# Patient Record
Sex: FEMALE | Race: ASIAN | NOT HISPANIC OR LATINO | URBAN - METROPOLITAN AREA
[De-identification: names, ages, dates, MRNs, and addresses within clinical notes are randomized per-mention and may not be internally consistent; named-entity substitution may affect disease eponyms.]

---

## 2024-08-02 ENCOUNTER — INPATIENT (INPATIENT)
Facility: HOSPITAL | Age: 29
LOS: 1 days | Discharge: HOME | End: 2024-08-04
Attending: SPECIALIST | Admitting: SPECIALIST

## 2024-08-02 VITALS — RESPIRATION RATE: 16 BRPM | TEMPERATURE: 98 F

## 2024-08-02 DIAGNOSIS — O99.810 ABNORMAL GLUCOSE COMPLICATING PREGNANCY: ICD-10-CM

## 2024-08-02 LAB
BASOPHILS # BLD AUTO: 0.02 K/UL — SIGNIFICANT CHANGE UP (ref 0–0.2)
BASOPHILS NFR BLD AUTO: 0.3 % — SIGNIFICANT CHANGE UP (ref 0–2)
EOSINOPHIL # BLD AUTO: 0.12 K/UL — SIGNIFICANT CHANGE UP (ref 0–0.5)
EOSINOPHIL NFR BLD AUTO: 1.7 % — SIGNIFICANT CHANGE UP (ref 0–6)
GLUCOSE BLDC GLUCOMTR-MCNC: 94 MG/DL — SIGNIFICANT CHANGE UP (ref 70–99)
HCT VFR BLD CALC: 38.7 % — SIGNIFICANT CHANGE UP (ref 34.5–45)
HGB BLD-MCNC: 12.7 G/DL — SIGNIFICANT CHANGE UP (ref 11.5–15.5)
IANC: 4.54 K/UL — SIGNIFICANT CHANGE UP (ref 1.8–7.4)
IMM GRANULOCYTES NFR BLD AUTO: 0.4 % — SIGNIFICANT CHANGE UP (ref 0–0.9)
LYMPHOCYTES # BLD AUTO: 1.63 K/UL — SIGNIFICANT CHANGE UP (ref 1–3.3)
LYMPHOCYTES # BLD AUTO: 23.3 % — SIGNIFICANT CHANGE UP (ref 13–44)
MCHC RBC-ENTMCNC: 28.4 PG — SIGNIFICANT CHANGE UP (ref 27–34)
MCHC RBC-ENTMCNC: 32.8 GM/DL — SIGNIFICANT CHANGE UP (ref 32–36)
MCV RBC AUTO: 86.6 FL — SIGNIFICANT CHANGE UP (ref 80–100)
MONOCYTES # BLD AUTO: 0.66 K/UL — SIGNIFICANT CHANGE UP (ref 0–0.9)
MONOCYTES NFR BLD AUTO: 9.4 % — SIGNIFICANT CHANGE UP (ref 2–14)
NEUTROPHILS # BLD AUTO: 4.54 K/UL — SIGNIFICANT CHANGE UP (ref 1.8–7.4)
NEUTROPHILS NFR BLD AUTO: 64.9 % — SIGNIFICANT CHANGE UP (ref 43–77)
NRBC # BLD: 0 /100 WBCS — SIGNIFICANT CHANGE UP (ref 0–0)
NRBC # FLD: 0 K/UL — SIGNIFICANT CHANGE UP (ref 0–0)
PLATELET # BLD AUTO: 149 K/UL — LOW (ref 150–400)
RBC # BLD: 4.47 M/UL — SIGNIFICANT CHANGE UP (ref 3.8–5.2)
RBC # FLD: 13.9 % — SIGNIFICANT CHANGE UP (ref 10.3–14.5)
WBC # BLD: 7 K/UL — SIGNIFICANT CHANGE UP (ref 3.8–10.5)
WBC # FLD AUTO: 7 K/UL — SIGNIFICANT CHANGE UP (ref 3.8–10.5)

## 2024-08-02 RX ORDER — OXYTOCIN/RINGER'S LACTATE 20/1000 ML
PLASTIC BAG, INJECTION (ML) INTRAVENOUS
Qty: 20 | Refills: 0 | Status: COMPLETED | OUTPATIENT
Start: 2024-08-02 | End: 2024-08-03

## 2024-08-02 RX ORDER — BACTERIOSTATIC SODIUM CHLORIDE 0.9 %
1000 VIAL (ML) INJECTION
Refills: 0 | Status: DISCONTINUED | OUTPATIENT
Start: 2024-08-02 | End: 2024-08-03

## 2024-08-02 RX ORDER — TRISODIUM CITRATE DIHYDRATE AND CITRIC ACID MONOHYDRATE 500; 334 MG/5ML; MG/5ML
15 SOLUTION ORAL EVERY 6 HOURS
Refills: 0 | Status: DISCONTINUED | OUTPATIENT
Start: 2024-08-02 | End: 2024-08-03

## 2024-08-02 RX ORDER — CHLORHEXIDINE GLUCONATE 500 MG/1
1 CLOTH TOPICAL DAILY
Refills: 0 | Status: DISCONTINUED | OUTPATIENT
Start: 2024-08-02 | End: 2024-08-03

## 2024-08-02 RX ORDER — DEXTROSE MONOHYDRATE, SODIUM CHLORIDE, SODIUM LACTATE, CALCIUM CHLORIDE, MAGNESIUM CHLORIDE 1.5; 538; 448; 18.4; 5.08 G/100ML; MG/100ML; MG/100ML; MG/100ML; MG/100ML
1000 SOLUTION INTRAPERITONEAL
Refills: 0 | Status: DISCONTINUED | OUTPATIENT
Start: 2024-08-02 | End: 2024-08-03

## 2024-08-02 NOTE — OB RN PATIENT PROFILE - FALL HARM RISK - UNIVERSAL INTERVENTIONS
Bed in lowest position, wheels locked, appropriate side rails in place/Call bell, personal items and telephone in reach/Instruct patient to call for assistance before getting out of bed or chair/Non-slip footwear when patient is out of bed/[place] to call system/Physically safe environment - no spills, clutter or unnecessary equipment/Purposeful Proactive Rounding/Room/bathroom lighting operational, light cord in reach

## 2024-08-03 PROBLEM — Z78.9 OTHER SPECIFIED HEALTH STATUS: Chronic | Status: ACTIVE | Noted: 2023-02-20

## 2024-08-03 LAB
GLUCOSE BLDC GLUCOMTR-MCNC: 100 MG/DL — HIGH (ref 70–99)
GLUCOSE BLDC GLUCOMTR-MCNC: 74 MG/DL — SIGNIFICANT CHANGE UP (ref 70–99)
GLUCOSE BLDC GLUCOMTR-MCNC: 81 MG/DL — SIGNIFICANT CHANGE UP (ref 70–99)
T PALLIDUM AB TITR SER: NEGATIVE — SIGNIFICANT CHANGE UP

## 2024-08-03 RX ORDER — WITCH HAZEL 500 MG/1
1 CLOTH TOPICAL EVERY 4 HOURS
Refills: 0 | Status: DISCONTINUED | OUTPATIENT
Start: 2024-08-03 | End: 2024-08-04

## 2024-08-03 RX ORDER — ACETAMINOPHEN 500 MG
975 TABLET ORAL
Refills: 0 | Status: DISCONTINUED | OUTPATIENT
Start: 2024-08-03 | End: 2024-08-04

## 2024-08-03 RX ORDER — KETOROLAC TROMETHAMINE 10 MG
30 TABLET ORAL ONCE
Refills: 0 | Status: DISCONTINUED | OUTPATIENT
Start: 2024-08-03 | End: 2024-08-03

## 2024-08-03 RX ORDER — BACTERIOSTATIC SODIUM CHLORIDE 0.9 %
3 VIAL (ML) INJECTION EVERY 8 HOURS
Refills: 0 | Status: DISCONTINUED | OUTPATIENT
Start: 2024-08-03 | End: 2024-08-04

## 2024-08-03 RX ORDER — LANOLIN 100 %
1 OINTMENT (GRAM) TOPICAL EVERY 6 HOURS
Refills: 0 | Status: DISCONTINUED | OUTPATIENT
Start: 2024-08-03 | End: 2024-08-04

## 2024-08-03 RX ORDER — DIPHENHYDRAMINE HCL 25 MG
25 CAPSULE ORAL ONCE
Refills: 0 | Status: DISCONTINUED | OUTPATIENT
Start: 2024-08-03 | End: 2024-08-03

## 2024-08-03 RX ORDER — OXYTOCIN/RINGER'S LACTATE 20/1000 ML
4 PLASTIC BAG, INJECTION (ML) INTRAVENOUS
Qty: 30 | Refills: 0 | Status: DISCONTINUED | OUTPATIENT
Start: 2024-08-03 | End: 2024-08-03

## 2024-08-03 RX ORDER — OXYTOCIN/RINGER'S LACTATE 20/1000 ML
41.67 PLASTIC BAG, INJECTION (ML) INTRAVENOUS
Qty: 20 | Refills: 0 | Status: DISCONTINUED | OUTPATIENT
Start: 2024-08-03 | End: 2024-08-04

## 2024-08-03 RX ORDER — HYDROCORTISONE 1 %
1 CREAM (GRAM) TOPICAL EVERY 6 HOURS
Refills: 0 | Status: DISCONTINUED | OUTPATIENT
Start: 2024-08-03 | End: 2024-08-04

## 2024-08-03 RX ORDER — CLOSTRIDIUM TETANI TOXOID ANTIGEN (FORMALDEHYDE INACTIVATED), CORYNEBACTERIUM DIPHTHERIAE TOXOID ANTIGEN (FORMALDEHYDE INACTIVATED), BORDETELLA PERTUSSIS TOXOID ANTIGEN (GLUTARALDEHYDE INACTIVATED), BORDETELLA PERTUSSIS FILAMENTOUS HEMAGGLUTININ ANTIGEN (FORMALDEHYDE INACTIVATED), BORDETELLA PERTUSSIS PERTACTIN ANTIGEN, AND BORDETELLA PERTUSSIS FIMBRIAE 2/3 ANTIGEN 5; 2; 2.5; 5; 3; 5 [LF]/.5ML; [LF]/.5ML; UG/.5ML; UG/.5ML; UG/.5ML; UG/.5ML
0.5 INJECTION, SUSPENSION INTRAMUSCULAR ONCE
Refills: 0 | Status: DISCONTINUED | OUTPATIENT
Start: 2024-08-03 | End: 2024-08-04

## 2024-08-03 RX ORDER — CRANBERRY FRUIT EXTRACT 650 MG
1 CAPSULE ORAL
Qty: 0 | Refills: 0 | DISCHARGE
Start: 2024-08-03

## 2024-08-03 RX ORDER — MAGNESIUM HYDROXIDE 400 MG/5ML
30 SUSPENSION, ORAL (FINAL DOSE FORM) ORAL
Refills: 0 | Status: DISCONTINUED | OUTPATIENT
Start: 2024-08-03 | End: 2024-08-04

## 2024-08-03 RX ORDER — IBUPROFEN 200 MG
600 TABLET ORAL EVERY 6 HOURS
Refills: 0 | Status: COMPLETED | OUTPATIENT
Start: 2024-08-03 | End: 2025-07-02

## 2024-08-03 RX ORDER — SIMETHICONE 125 MG/1
80 TABLET, CHEWABLE ORAL EVERY 4 HOURS
Refills: 0 | Status: DISCONTINUED | OUTPATIENT
Start: 2024-08-03 | End: 2024-08-04

## 2024-08-03 RX ORDER — CRANBERRY FRUIT EXTRACT 650 MG
1 CAPSULE ORAL DAILY
Refills: 0 | Status: DISCONTINUED | OUTPATIENT
Start: 2024-08-03 | End: 2024-08-04

## 2024-08-03 RX ORDER — OXYCODONE HYDROCHLORIDE 30 MG/1
5 TABLET ORAL ONCE
Refills: 0 | Status: DISCONTINUED | OUTPATIENT
Start: 2024-08-03 | End: 2024-08-04

## 2024-08-03 RX ORDER — DIPHENHYDRAMINE HCL 25 MG
25 CAPSULE ORAL EVERY 6 HOURS
Refills: 0 | Status: DISCONTINUED | OUTPATIENT
Start: 2024-08-03 | End: 2024-08-04

## 2024-08-03 RX ORDER — OXYCODONE HYDROCHLORIDE 30 MG/1
5 TABLET ORAL
Refills: 0 | Status: DISCONTINUED | OUTPATIENT
Start: 2024-08-03 | End: 2024-08-04

## 2024-08-03 RX ORDER — IBUPROFEN 200 MG
600 TABLET ORAL EVERY 6 HOURS
Refills: 0 | Status: DISCONTINUED | OUTPATIENT
Start: 2024-08-03 | End: 2024-08-04

## 2024-08-03 RX ORDER — ACETAMINOPHEN 500 MG
3 TABLET ORAL
Qty: 0 | Refills: 0 | DISCHARGE
Start: 2024-08-03

## 2024-08-03 RX ORDER — DIBUCAINE 1 %
1 OINTMENT (GRAM) TOPICAL EVERY 6 HOURS
Refills: 0 | Status: DISCONTINUED | OUTPATIENT
Start: 2024-08-03 | End: 2024-08-04

## 2024-08-03 RX ORDER — DIPHENHYDRAMINE HCL 25 MG
25 CAPSULE ORAL ONCE
Refills: 0 | Status: COMPLETED | OUTPATIENT
Start: 2024-08-03 | End: 2024-08-03

## 2024-08-03 RX ORDER — IBUPROFEN 200 MG
1 TABLET ORAL
Qty: 0 | Refills: 0 | DISCHARGE
Start: 2024-08-03

## 2024-08-03 RX ADMIN — Medication 1000 MILLIUNIT(S)/MIN: at 14:07

## 2024-08-03 RX ADMIN — DEXTROSE MONOHYDRATE, SODIUM CHLORIDE, SODIUM LACTATE, CALCIUM CHLORIDE, MAGNESIUM CHLORIDE 125 MILLILITER(S): 1.5; 538; 448; 18.4; 5.08 SOLUTION INTRAPERITONEAL at 09:05

## 2024-08-03 RX ADMIN — Medication 975 MILLIGRAM(S): at 21:22

## 2024-08-03 RX ADMIN — Medication 975 MILLIGRAM(S): at 22:07

## 2024-08-03 RX ADMIN — Medication 25 MILLIGRAM(S): at 05:35

## 2024-08-03 RX ADMIN — Medication 30 MILLIGRAM(S): at 15:25

## 2024-08-03 RX ADMIN — Medication 30 MILLIGRAM(S): at 16:00

## 2024-08-03 RX ADMIN — Medication 3 MILLILITER(S): at 22:07

## 2024-08-03 RX ADMIN — Medication 4 MILLIUNIT(S)/MIN: at 09:05

## 2024-08-03 NOTE — OB RN DELIVERY SUMMARY - NSSELHIDDEN_OBGYN_ALL_OB_FT
[NS_DeliveryAttending1_OBGYN_ALL_OB_FT:[id number]],[NS_DeliveryAssist1_OBGYN_ALL_OB_FT:[id number]],[NS_DeliveryRN_OBGYN_ALL_OB_FT:[id number]=]

## 2024-08-03 NOTE — DISCHARGE NOTE OB - MATERIALS PROVIDED
[facility] [place] Screening Program/  Immunization Record/Breastfeeding Log/Bottle Feeding Log/Breastfeeding Mother’s Support Group Information/Guide to Postpartum Care/[facility] Hearing Screen Program/Back To Sleep Handout/Shaken Baby Prevention Handout/Breastfeeding Guide and Packet/Birth Certificate Instructions/Discharge Medication Information for Patients and Families Pocket Guide/Tdap Vaccination (VIS Pub Date: 2012)

## 2024-08-03 NOTE — DISCHARGE NOTE OB - CARE PLAN
Principal Discharge DX:	Normal vaginal delivery  Assessment and plan of treatment:	REGULAR DIET  AMBULATION ENCOURAGED   1

## 2024-08-03 NOTE — OB RN DELIVERY SUMMARY - NS_SEPSISRSKCALC_OBGYN_ALL_OB_FT
EOS calculated successfully. EOS Risk Factor: 0.5/1000 live births ([facility] national incidence); GA=39w1d; Temp=98.42; ROM=1.567; GBS='Negative'; Antibiotics='No antibiotics or any antibiotics < 2 hrs prior to birth'

## 2024-08-03 NOTE — DISCHARGE NOTE OB - PATIENT PORTAL LINK FT
You can access the FollowMyHealth Patient Portal offered by [facility] by registering at the following website: [email or web address]. By joining [facility]s FollowMyHealth portal, you will also be able to view your health information using other applications (apps) compatible with our system.

## 2024-08-03 NOTE — OB PROVIDER LABOR PROGRESS NOTE - NS_SUBJECTIVE/OBJECTIVE_OBGYN_ALL_OB_FT
Pt seen and examined at bedside for insertion of cervical balloon.      T(C): 36.8 (08-03-24 @ 03:00), Max: 36.9 (08-02-24 @ 23:08)  HR: 78 (08-03-24 @ 03:28) (68 - 102)  BP: 102/58 (08-03-24 @ 03:21) (90/51 - 120/68)  RR: 16 (08-03-24 @ 03:00) (16 - 18)  SpO2: 100% (08-03-24 @ 03:28) (85% - 100%)

## 2024-08-03 NOTE — OB PROVIDER DELIVERY SUMMARY - NSSELHIDDEN_OBGYN_ALL_OB_FT
[NS_DeliveryAttending1_OBGYN_ALL_OB_FT:[id number]],[NS_DeliveryAssist1_OBGYN_ALL_OB_FT:[id number]=]

## 2024-08-03 NOTE — OB PROVIDER H&P - NSLOWPPHRISK_OBGYN_A_OB
No previous uterine incision/[place] Pregnancy/Less than or equal to 4 previous vaginal births/No known bleeding disorder/No history of postpartum hemorrhage/No other PPH risks indicated

## 2024-08-03 NOTE — OB PROVIDER H&P - ASSESSMENT
A/P: KAYLEIGH EVANGELISTA is a 28yo G at  (dated by c/w x trimester sono) presenting for IOL for GDMA2.      - Admit to L&D for eIOL:        Admission labs        Consent        Induction method: ***  - Admit to L&D for CS:        Pre op labs        Consent        Antibiotics  - Labs:         GBS         Rub I/I        HIV        Syphilis        Blood type        Hb  - Maternal and fetal status reassuring, will continue to monitor ***        VSS        Cephalic presentation        Cat 1 tracing        Not yehuda  - Analgesia: ***    D/w ***     A/P: KAYLEIGH EVANGELISTA is a 29y  at 39 weeks GA by LMP consistent with trimester sono who presents to L&D for scheduled IOL due to GDMA2.    - Admit to L&D for eIOL:        Admission labs        Consent        Induction method: ***  - Admit to L&D for CS:        Pre op labs        Consent        Antibiotics  - Labs:         GBS         Rub I/I        HIV        Syphilis        Blood type        Hb  - Maternal and fetal status reassuring, will continue to monitor ***        VSS        Cephalic presentation        Cat 1 tracing        Not yehuda  - Analgesia: ***    D/w ***   A/P: KAYLEIGH EVANGELISTA is a 29y  at 39 weeks GA by LMP consistent with trimester sono who presents to L&D for scheduled IOL due to GDMA2.    - Admit to L&D for eIOL:        Admission labs        Consent        Induction method: PO Cytotec and Cervical Balloon  - Labs:         GBS negative        Rub I/I        HIV        Syphilis        Blood type        CBC  - Maternal and fetal status reassuring, will continue to monitor         VSS        Cephalic presentation        Cat 1 tracing        Irregular contractions  - Analgesia: Epidural anesthesia at patient's request    Discussed with Dr. Horacio Salomon, PGY-1

## 2024-08-03 NOTE — DISCHARGE NOTE OB - CARE PROVIDER_API CALL
Ayla Luther  Obstetrics and Gynecology  [address] [place] [address], [address]  [place] [address]  Phone: [phone]  Fax: [phone]  Follow Up Time: 1 month

## 2024-08-03 NOTE — OB NEONATOLOGY/PEDIATRICIAN DELIVERY SUMMARY - NSPEDSNEONOTESA_OBGYN_ALL_OB_FT
Baby is a 39,1 wk AGA female born to a 28 y/o  mother via . PEDS called to delivery for Category 2 tracing. Maternal history uncomplicated. GDMA2 in pregnancy. Maternal blood type B+. PNL HIV negative, HepB negative, RPR non-reactive, and Rubella immune. GBS negative on . SROM at 1227 on , clear fluids. Delivery uncomplicated. Baby born vigorous and crying spontaneously. Warmed, dried, suctioned and stimulated. Apgars 9/9. Highest maternal temp 36.9 C. EOS 0.06. Mom plans to breastfeed and consents hepB.    BW: 3100 g  : 2024  TOB: 1401    Physical Exam:  Gen: NAD, +grimace  HEENT: anterior fontanel open soft and flat, ears normal set. nares clinically patent  Resp: no increased work of breathing, good air entry b/l, clear to auscultation bilaterally  Cardio: normal S1/S2, regular rate and rhythm, no murmur appreciated  Abd: soft, non tender, non distended, umbilical cord with 3 vessels  Back: spine midline, no sacral dimple or tuft of hair  Neuro: +grasp/suck/ita/palmar/plantar, normal tone  Extremities: moving all extremities, full range of motion x 4  Skin: pink, warm, acrocyanosis  Genitals: Normal female anatomy, Joby 1, anus patent

## 2024-08-03 NOTE — OB PROVIDER H&P - NS_OBGYNHISTORY_OBGYN_ALL_OB_FT
FT   FT 39w, 7 lbs Ob Hx:  : 2023  @ 39wks, 7#, PNC complicated by GDMA1  G2: current pregnancy, complicated by GDMA2    PGYN: -fibroids, -ovarian cysts, denies STD hx, denies abnormal PAPs

## 2024-08-03 NOTE — OB PROVIDER H&P - HISTORY OF PRESENT ILLNESS
29y GP at _ weeks GA by LMP consistent with trimester sono who presents to L&D for .   RAMIRO:  LMP:   Prenatal course is significant for:  Prenatal course uncomplicated.      Patient denies vaginal bleeding, contractions and leakage of fluid. She endorses good fetal movement. Denies fevers, chills, nausea and vomiting. No other complaints at this time.     POB:  PGYN: -fibroids, -ovarian cysts, denies STD hx, denies abnormal PAPs   PMH: Denies  PSH: Denies  SH: Denies EtOH, tobacco and illicit drug use during this pregnancy; feels safe at home   Meds: PNVs  Allergies: NKDA    BMI:  Sono:  EFW:        T(C): 36.8 (08-02-24 @ 23:21), Max: 36.9 (08-02-24 @ 23:08)  HR: 87 (08-02-24 @ 23:21) (87 - 102)  BP: 120/68 (08-02-24 @ 23:21) (100/68 - 120/68)  RR: 18 (08-02-24 @ 23:21) (16 - 18)  SpO2: 98% (08-02-24 @ 21:40) (98% - 98%)  Gen: NAD, well-appearing   Abd: Soft, gravid  Ext: non-tender, non-edematous  SVE:    Bedside sono:  FHT:  [place]:        29y  at 39 weeks GA by LMP consistent with trimester sono who presents to L&D for scheduled IOL due to GDMA2. EFW was 3232g on  and GBS was negative.   RAMIRO: 24  LMP: 23  Prenatal course is significant for: GDMA2 Dx around 6-7 months, managed by insulin 14 units taken at bedtime. Fingersticks QID. Fasting sugars at or below 90s and post-meal sugars at or below 130s.  Prenatal course uncomplicated.      Patient denies vaginal bleeding and leakage of fluid. She endorses good fetal movement and painful contractions every 20 minutes. Denies fevers, chills, nausea and vomiting. No other complaints at this time.     POB: 1st pregnancy was  in  @39w 7lb. Diagnosed with GDMA1 around 6-7 months, managed only w/ diet.  PGYN: -fibroids, -ovarian cysts, denies STD hx, denies abnormal PAPs   PMH: Denies  PSH: Denies  SH: Denies EtOH, tobacco and illicit drug use during this pregnancy; feels safe at home   Meds: PNVs, iron, vitamin D, folic acid  Allergies: NKDA    BMI:  Sono:  EFW:        T(C): 36.8 (24 @ 23:21), Max: 36.9 (24 @ 23:08)  HR: 87 (24 @ 23:21) (87 - 102)  BP: 120/68 (24 @ 23:21) (100/68 - 120/68)  RR: 18 (24 @ 23:21) (16 - 18)  SpO2: 98% (24 @ 21:40) (98% - 98%)  Gen: NAD, well-appearing   Abd: Soft, gravid  Ext: non-tender, non-edematous  SVE:    Bedside sono:  FHT:  [place]:        29y  at 39 weeks GA by LMP consistent with trimester sono who presents to L&D for scheduled IOL for GDMA2.   RAMIRO: 24  LMP: 23  Prenatal course is significant for: GDMA2, on  Humulin 14 units qhs. Fingersticks QID. Fasting sugars at or below 90s and post-meal sugars at or below 130s.    Patient denies vaginal bleeding and leakage of fluid. She endorses good fetal movement and painful contractions every 20 minutes. Denies fevers, chills, nausea and vomiting. No other complaints at this time.     PMH: Denies  PSH: Denies  SH: Denies EtOH, tobacco and illicit drug use during this pregnancy; feels safe at home   Meds: PNVs, iron, vitamin D, folic acid  Allergies: NKDA    BMI: 34.0  EFW: 3227g        T(C): 36.8 (24 @ 23:21), Max: 36.9 (24 @ 23:08)  HR: 87 (24 @ 23:21) (87 - 102)  BP: 120/68 (24 @ 23:21) (100/68 - 120/68)  RR: 18 (24 @ 23:21) (16 - 18)  SpO2: 98% (24 @ 21:40) (98% - 98%)    Gen: NAD, well-appearing   Abd: Soft, gravid  Ext: non-tender, non-edematous  SVE:  /-3  Bedside sono: Cephalic  FHT: 150/mod/+accels/-decels  [place]: Irregular ctx

## 2024-08-03 NOTE — OB PROVIDER DELIVERY SUMMARY - NSPROVIDERDELIVERYNOTE_OBGYN_ALL_OB_FT
of liveborn infant from CARLY position  Head, shoulders, and body delivered easily  Infant suctioned, no mec  Cord clamped and cut after 1m delay and infant passed to mother  Placenta delivered spontaneously, intact  Fundus firm with fundal massage and pitocin 20U IV  Exam revealed intact cervix, sulci, and vaginal walls.  2nd degree perineal laceration repaired with 2-0 chromic  Manual exploration of uterus revealed no retained products.   Excellent hemostasis noted  Mother and baby resting comfortably     Dr. Suero present at delivery   Krista Goldman PGY1

## 2024-08-03 NOTE — OB RN DELIVERY SUMMARY - NS_GENERALBABYACOMMENTA_OBGYN_ALL_OB_FT
Delayed STS due to pediatrician assessment of infant. Pt declined longer STS after recommendation and education. PT wishes for infant to be swaddled and FOB and family support to hold infant.

## 2024-08-03 NOTE — OB PROVIDER LABOR PROGRESS NOTE - ASSESSMENT
29y  @ 39wks/1d here for IOL for GDMA2    - PO 21@230a  - VE /-3  - CB placed at 325a without incident  - Cont fetal/maternal monitoring  - Will reassess PRN    Plan as per Dr. Horacio Salomon, PGY-1

## 2024-08-03 NOTE — DISCHARGE NOTE OB - MEDICATION SUMMARY - MEDICATIONS TO TAKE
I will START or STAY ON the medications listed below when I get home from the hospital:    ibuprofen 600 mg oral tablet  -- 1 tab(s) by mouth every 6 hours  -- Indication: For Abnormal glucose affecting pregnancy    acetaminophen 325 mg oral tablet  -- 3 tab(s) by mouth every 8 hours as needed for  moderate pain  -- Indication: For Abnormal glucose affecting pregnancy    Prenatal Multivitamins with Folic Acid 1 mg oral tablet  -- 1 tab(s) by mouth once a day  -- Indication: For Abnormal glucose affecting pregnancy

## 2024-08-03 NOTE — OB PROVIDER H&P - NSHPLABSRESULTS_GEN_ALL_CORE
CBC Full  -  ( 02 Aug 2024 23:26 )  WBC Count : 7.00 K/uL  RBC Count : 4.47 M/uL  Hemoglobin : 12.7 g/dL  Hematocrit : 38.7 %  Platelet Count - Automated : 149 K/uL  Mean Cell Volume : 86.6 fL  Mean Cell Hemoglobin : 28.4 pg  Mean Cell Hemoglobin Concentration : 32.8 gm/dL  Auto Neutrophil # : 4.54 K/uL  Auto Lymphocyte # : 1.63 K/uL  Auto Monocyte # : 0.66 K/uL  Auto Eosinophil # : 0.12 K/uL  Auto Basophil # : 0.02 K/uL  Auto Neutrophil % : 64.9 %  Auto Lymphocyte % : 23.3 %  Auto Monocyte % : 9.4 %  Auto Eosinophil % : 1.7 %  Auto Basophil % : 0.3 %    POCT Blood Glucose (08.02.24 @ 23:46)   POCT Blood Glucose.: 94 mg/dL

## 2024-08-04 VITALS
DIASTOLIC BLOOD PRESSURE: 77 MMHG | HEART RATE: 86 BPM | SYSTOLIC BLOOD PRESSURE: 123 MMHG | RESPIRATION RATE: 16 BRPM | OXYGEN SATURATION: 100 %

## 2024-08-04 LAB
HCT VFR BLD CALC: 28.8 % — LOW (ref 34.5–45)
HGB BLD-MCNC: 9.5 G/DL — LOW (ref 11.5–15.5)

## 2024-08-04 RX ADMIN — Medication 1 TABLET(S): at 12:27

## 2024-08-04 RX ADMIN — Medication 3 MILLILITER(S): at 06:05

## 2024-08-04 RX ADMIN — Medication 975 MILLIGRAM(S): at 02:28

## 2024-08-04 RX ADMIN — Medication 975 MILLIGRAM(S): at 09:06

## 2024-08-04 RX ADMIN — Medication 975 MILLIGRAM(S): at 03:00

## 2024-08-04 RX ADMIN — Medication 975 MILLIGRAM(S): at 10:00

## 2024-08-04 NOTE — PROGRESS NOTE ADULT - ASSESSMENT
A: 29y PPD#1 s/p  doing well.    Plan:  Analgesia prn  Regular diet  Routine post partum care  Discharge planning

## 2024-08-04 NOTE — PROGRESS NOTE ADULT - SUBJECTIVE AND OBJECTIVE BOX
PPD#1- ATTENDING NOTE    S: Patient doing well. Minimal lochia. Pain controlled.    O: Vital Signs Last 24 Hrs  T(C): 36.5 (04 Aug 2024 06:02), Max: 36.9 (03 Aug 2024 19:50)  T(F): 97.7 (04 Aug 2024 06:02), Max: 98.4 (03 Aug 2024 19:50)  HR: 65 (04 Aug 2024 06:02) (63 - 109)  BP: 90/50 (04 Aug 2024 06:02) (81/46 - 132/59)  BP(mean): --  RR: 18 (04 Aug 2024 06:02) (16 - 18)  SpO2: 100% (04 Aug 2024 06:02) (71% - 100%)    Parameters below as of 04 Aug 2024 06:02  Patient On (Oxygen Delivery Method): room air        Gen: NAD  Abd: soft, NT, ND, fundus firm below umbilicus  Lochia: moderate  Ext: no tenderness    Labs:                        9.5    x     )-----------( x        ( 04 Aug 2024 06:00 )             28.8       acetaminophen     Tablet .. 975 milliGRAM(s) Oral <User Schedule>  benzocaine 20%/menthol 0.5% Spray 1 Spray(s) Topical every 6 hours PRN  dibucaine 1% Ointment 1 Application(s) Topical every 6 hours PRN  diphenhydrAMINE 25 milliGRAM(s) Oral every 6 hours PRN  diphtheria/tetanus/pertussis (acellular) Vaccine (Adacel) 0.5 milliLiter(s) IntraMuscular once  hydrocortisone 1% Cream 1 Application(s) Topical every 6 hours PRN  ibuprofen  Tablet. 600 milliGRAM(s) Oral every 6 hours  lanolin Ointment 1 Application(s) Topical every 6 hours PRN  magnesium hydroxide Suspension 30 milliLiter(s) Oral two times a day PRN  oxyCODONE    IR 5 milliGRAM(s) Oral every 3 hours PRN  oxyCODONE    IR 5 milliGRAM(s) Oral once PRN  oxytocin Infusion 41.667 milliUNIT(s)/Min IV Continuous <Continuous>  pramoxine 1%/zinc 5% Cream 1 Application(s) Topical every 4 hours PRN  prenatal multivitamin 1 Tablet(s) Oral daily  simethicone 80 milliGRAM(s) Chew every 4 hours PRN  sodium chloride 0.9% lock flush 3 milliLiter(s) IV Push every 8 hours  witch hazel Pads 1 Application(s) Topical every 4 hours PRN
